# Patient Record
Sex: MALE | Race: WHITE | NOT HISPANIC OR LATINO | Employment: FULL TIME | URBAN - METROPOLITAN AREA
[De-identification: names, ages, dates, MRNs, and addresses within clinical notes are randomized per-mention and may not be internally consistent; named-entity substitution may affect disease eponyms.]

---

## 2024-06-20 ENCOUNTER — OFFICE VISIT (OUTPATIENT)
Age: 39
End: 2024-06-20

## 2024-06-20 ENCOUNTER — APPOINTMENT (OUTPATIENT)
Age: 39
End: 2024-06-20
Payer: MEDICARE

## 2024-06-20 VITALS
WEIGHT: 195 LBS | HEART RATE: 62 BPM | BODY MASS INDEX: 29.55 KG/M2 | SYSTOLIC BLOOD PRESSURE: 110 MMHG | HEIGHT: 68 IN | DIASTOLIC BLOOD PRESSURE: 62 MMHG

## 2024-06-20 DIAGNOSIS — M25.561 RIGHT KNEE PAIN, UNSPECIFIED CHRONICITY: Primary | ICD-10-CM

## 2024-06-20 DIAGNOSIS — M25.561 RIGHT KNEE PAIN, UNSPECIFIED CHRONICITY: ICD-10-CM

## 2024-06-20 PROCEDURE — 73562 X-RAY EXAM OF KNEE 3: CPT

## 2024-06-20 PROCEDURE — 99203 OFFICE O/P NEW LOW 30 MIN: CPT | Performed by: ORTHOPAEDIC SURGERY

## 2024-06-20 RX ORDER — METHADONE HYDROCHLORIDE 10 MG/ML
CONCENTRATE ORAL
COMMUNITY

## 2024-06-20 NOTE — PROGRESS NOTES
CHIEF COMPLAINT/REASON FOR VISIT  Chief Complaint   Patient presents with    Right Knee - Pain     Pt states this past Saturday pt was at work, and was squatting when he noticed increased pain when he was walking and increased swelling . Pt states he believes he might have a torn meniscus. Pt states he has been icing the knee and has had tape on his knee with ace bandages and has not reduced swelling much.         HISTORY OF PRESENT ILLNESS  Koko Lopez is a 39 y.o. male who presents for evaluation of his right knee.  Patient said for the past week he has been dealing with right knee pain. 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 Patient describes the pain on the medial side of the joint. Patient reports catching and locking of the joint when walking. He reports knee swelling as well. Patient denies prior injections and surgery.    REVIEW OF SYSTEMS  Review of systems was performed and, outside that mentioned in the HPI, it was negative for symptomology related to the integumentary, hematologic, immunologic, allergic, neurologic, cardiovascular, respiratory, GI or  systems.    MEDICAL HISTORY  There is no problem list on file for this patient.      SURGICAL HISTORY  No past surgical history on file.    CURRENT MEDICATIONS    Current Outpatient Medications:     methadone (DOLOPHINE) 10 mg/mL oral concentrated solution, Take by mouth, Disp: , Rfl:     SOCIAL HISTORY  Social History     Socioeconomic History    Marital status: Single     Spouse name: Not on file    Number of children: Not on file    Years of education: Not on file    Highest education level: Not on file   Occupational History    Not on file   Tobacco Use    Smoking status: Not on file    Smokeless tobacco: Not on file   Substance and Sexual Activity    Alcohol use: Not on file    Drug use: Not on file    Sexual activity: Not on file   Other Topics Concern    Not on file   Social History  "Narrative    Not on file     Social Determinants of Health     Financial Resource Strain: Not on file   Food Insecurity: Not on file   Transportation Needs: Not on file   Physical Activity: Not on file   Stress: Not on file   Social Connections: Not on file   Intimate Partner Violence: Not on file   Housing Stability: Not on file       Objective     VITAL SIGNS  /62   Pulse 62   Ht 5' 8\" (1.727 m)   Wt 88.5 kg (195 lb)   BMI 29.65 kg/m²        PHYSICAL EXAMINATION    Musculoskeletal: Right Knee Examination:  General: The patient is alert, oriented, and pleasant to interact with.  Patient ambulates with Normal gait pattern  Assistive Device: No  Alignment: normal  Skin is warm and dry to touch with no signs of erythema, ecchymosis, or infection   Effusion: moderate  ROM: 0° - 130°    MMT: 5/5 throughout  TTP: Medial joint line  Flexor and extensor mechanisms are intact   Knee is stable to varus and valgus stress  Casandra's Test Positive Medial    Lachman's Test - 1A  Lateral Patellar Glide - 1/4  Medial Patellar Glide - 1/4  Patella tracks centrally without palpable crepitus  2+ DP and PT pulses with brisk capillary refill to the toes  Sural, saphenous, tibial, superficial, and deep peroneal motor and sensory distributions intact  Sensation light touch intact distally      RADIOGRAPHIC EXAMINATION/DIAGNOSTICS:  Right knee x-ray shows no acute osseous abnormalities    ASSESSMENT/PLAN:  Right knee pain; r/o internal derangement    History and clinical exam consistent with concern for soft tissue injury (ligamentous/meniscus). Plan to obtain MRI of the patient knee to further evaluate soft tissue injury. Patient will follow up to discuss results of the study and treatment plan. Recommend RICE and anti-inflammatories at this time.  They are to call with any other questions or concerns prior to next appointment.    Scribe Attestation      I,:  Giovanni Luis PA-C am acting as a scribe while in the presence " of the attending physician.:       I,:  Bj Styles MD personally performed the services described in this documentation    as scribed in my presence.:

## 2024-06-20 NOTE — LETTER
June 20, 2024     Patient: Koko Lopez  YOB: 1985  Date of Visit: 6/20/2024      To Whom it May Concern:    Koko Lopez is under my professional care. Koko was seen in my office on 6/20/2024. Koko should have no physical activity to right lower extremity for the next 2-4 weeks as he is evaluated for orthopedic injury.    If you have any questions or concerns, please don't hesitate to call.         Sincerely,          Bj Styles MD        CC: No Recipients

## 2024-07-09 ENCOUNTER — HOSPITAL ENCOUNTER (OUTPATIENT)
Dept: MRI IMAGING | Facility: HOSPITAL | Age: 39
Discharge: HOME | End: 2024-07-09
Payer: MEDICARE

## 2024-07-09 DIAGNOSIS — M25.561 RIGHT KNEE PAIN, UNSPECIFIED CHRONICITY: ICD-10-CM

## 2024-07-09 PROCEDURE — 73721 MRI JNT OF LWR EXTRE W/O DYE: CPT

## 2024-07-18 ENCOUNTER — OFFICE VISIT (OUTPATIENT)
Age: 39
End: 2024-07-18
Payer: MEDICARE

## 2024-07-18 VITALS
WEIGHT: 197 LBS | HEART RATE: 58 BPM | SYSTOLIC BLOOD PRESSURE: 112 MMHG | HEIGHT: 68 IN | DIASTOLIC BLOOD PRESSURE: 70 MMHG | BODY MASS INDEX: 29.86 KG/M2

## 2024-07-18 DIAGNOSIS — M25.561 RIGHT KNEE PAIN, UNSPECIFIED CHRONICITY: Primary | ICD-10-CM

## 2024-07-18 PROCEDURE — 99213 OFFICE O/P EST LOW 20 MIN: CPT | Performed by: ORTHOPAEDIC SURGERY

## 2024-07-18 NOTE — PROGRESS NOTES
CHIEF COMPLAINT/REASON FOR VISIT  Chief Complaint   Patient presents with    Right Knee - Follow-up     MRI review right knee.         HISTORY OF PRESENT ILLNESS  Koko Lopez is a 39 y.o. male who presents for evaluation of his right knee as well as to discuss MRI results. Today, patient reports that his pain has improved since his last visit as he has been modifying his activities as well as icing and resting his knee. He actually reports that when he feels pain it is localized about the anterolateral aspect of the knee. He denies mechanical symptoms since his last visit. No numbness or tingling. No fevers or chills.    REVIEW OF SYSTEMS  Review of systems was performed and, outside that mentioned in the HPI, it was negative for symptomology related to the integumentary, hematologic, immunologic, allergic, neurologic, cardiovascular, respiratory, GI or  systems.    MEDICAL HISTORY  There is no problem list on file for this patient.      SURGICAL HISTORY  History reviewed. No pertinent surgical history.    CURRENT MEDICATIONS    Current Outpatient Medications:     methadone (DOLOPHINE) 10 mg/mL oral concentrated solution, Take by mouth, Disp: , Rfl:     SOCIAL HISTORY  Social History     Socioeconomic History    Marital status: Single     Spouse name: Not on file    Number of children: Not on file    Years of education: Not on file    Highest education level: Not on file   Occupational History    Not on file   Tobacco Use    Smoking status: Not on file    Smokeless tobacco: Not on file   Substance and Sexual Activity    Alcohol use: Not on file    Drug use: Not on file    Sexual activity: Not on file   Other Topics Concern    Not on file   Social History Narrative    Not on file     Social Determinants of Health     Financial Resource Strain: Not on file   Food Insecurity: Not on file   Transportation Needs: Not on file   Physical Activity: Not on file   Stress: Not on file   Social Connections: Not on file  "  Intimate Partner Violence: Not on file   Housing Stability: Not on file       Objective     VITAL SIGNS  /70   Pulse 58   Ht 5' 8\" (1.727 m)   Wt 89.4 kg (197 lb)   BMI 29.95 kg/m²        PHYSICAL EXAMINATION    Musculoskeletal: Right Knee Examination:  General: The patient is alert, oriented, and pleasant to interact with.  Patient ambulates with Normal gait pattern  Assistive Device: No  Alignment: normal  Skin is warm and dry to touch with no signs of erythema, ecchymosis, or infection   Effusion: none  ROM: 0° - 135°  verus 0° - 135° on contralateral side  MMT: 5/5 throughout  TTP: None  Flexor and extensor mechanisms are intact   Knee is stable to varus and valgus stress  Casandra's Test Negative    Lachman's Test - 1A  Anterior Drawer Test - 1A  Posterior Drawer Test - 1A  Pivot Shift - 0  Lateral Patellar Glide - 1/4  Medial Patellar Glide - 1/4  Patella tracks centrally without palpable crepitus  Calf compartments are soft and supple  negative Dung's sign  2+ DP and PT pulses with brisk capillary refill to the toes  Sural, saphenous, tibial, superficial, and deep peroneal motor and sensory distributions intact  Sensation light touch intact distally      RADIOGRAPHIC EXAMINATION/DIAGNOSTICS:  Procedure: MRI knee right  wo contrast    Result Date: 7/10/2024  Narrative: MRI RIGHT KNEE INDICATION:   M25.561: Pain in right knee. COMPARISON: X-ray right knee dated June 20, 2024 TECHNIQUE: Multiplanar/multisequence MR of the right knee was performed. FINDINGS: SUBCUTANEOUS TISSUES: Normal JOINT EFFUSION: None. BAKER'S CYST: None. MENISCI: Probable horizontal tear in the body of medial meniscus that appears to reach the inferior articular surface at least on a single slice (series 6 images 22-23). Mild reactive bone marrow edema in the medial femoral condyle. No flipped fragments.  Intact lateral meniscus. CRUCIATE LIGAMENTS: Intact. EXTENSOR APPARATUS: Intact. COLLATERAL LIGAMENTS: Intact. ARTICULAR " SURFACES: Normal. BONES: Mild reactive bone marrow edema in the medial femoral condyle. Otherwise, no acute or suspicious osseous abnormality. MUSCULATURE:  Intact.     Impression: 1. Probable horizontal tear of medial meniscus body with mild reactive bone marrow edema in the lateral femoral condyle. 2. Otherwise, unremarkable MRI of right knee. Workstation performed: [id number]     Procedure: XR knee 3 vw right non injury    Result Date: 6/20/2024  Narrative: RIGHT KNEE INDICATION:   Pain in right knee.   COMPARISON:  None. VIEWS:  XR KNEE 3 VW RIGHT NON INJURY Images: 3 FINDINGS: There is no acute fracture or dislocation. There is no joint effusion. No significant degenerative changes. No lytic or blastic osseous lesion. Soft tissues are unremarkable.     Impression: No acute osseous abnormality. Electronically signed: 06/20/2024 04:16 PM Arnie Woods MD     ASSESSMENT/PLAN:  Explained to the patient that his MRI does show an injury to his medial meniscus but he is asymptomatic today on examination so it is recommended that he continue to monitor his symptoms. No surgical intervention is recommended at this time  He may return to normal activities as tolerated with modifications to avoid pain  Follow up on an as needed basis        Scribe Attestation      I,:  Koko Kiran am acting as a scribe while in the presence of the attending physician.:       I,:  Bj Styles MD personally performed the services described in this documentation    as scribed in my presence.: